# Patient Record
Sex: FEMALE | Race: WHITE | Employment: STUDENT | URBAN - METROPOLITAN AREA
[De-identification: names, ages, dates, MRNs, and addresses within clinical notes are randomized per-mention and may not be internally consistent; named-entity substitution may affect disease eponyms.]

---
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
## 2021-01-19 ENCOUNTER — OFFICE VISIT (OUTPATIENT)
Dept: ORTHOPEDICS CLINIC | Facility: CLINIC | Age: 21
End: 2021-01-19
Payer: COMMERCIAL

## 2021-01-19 VITALS — HEART RATE: 80 BPM | OXYGEN SATURATION: 99 %

## 2021-01-19 DIAGNOSIS — S63.092A SUBLUXATION OF LEFT EXTENSOR CARPI ULNARIS TENDON, INITIAL ENCOUNTER: Primary | ICD-10-CM

## 2021-01-19 PROCEDURE — 99203 OFFICE O/P NEW LOW 30 MIN: CPT | Performed by: ORTHOPAEDIC SURGERY
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
## 2021-02-12 ENCOUNTER — HOSPITAL ENCOUNTER (OUTPATIENT)
Dept: ULTRASOUND IMAGING | Facility: HOSPITAL | Age: 21
Discharge: HOME | End: 2021-02-12
Attending: ORTHOPAEDIC SURGERY
Payer: COMMERCIAL

## 2021-02-12 DIAGNOSIS — S63.092A SUBLUXATION OF LEFT EXTENSOR CARPI ULNARIS TENDON, INITIAL ENCOUNTER: ICD-10-CM

## 2021-02-12 PROCEDURE — 76881 US COMPL JOINT R-T W/IMG: CPT | Performed by: ORTHOPAEDIC SURGERY

## 2021-02-16 ENCOUNTER — HOSPITAL ENCOUNTER (OUTPATIENT)
Dept: MRI IMAGING | Facility: HOSPITAL | Age: 21
Discharge: HOME | End: 2021-02-16
Attending: ORTHOPAEDIC SURGERY
Payer: COMMERCIAL

## 2021-02-16 ENCOUNTER — HOSPITAL ENCOUNTER (OUTPATIENT)
Dept: GENERAL RADIOLOGY | Facility: HOSPITAL | Age: 21
Discharge: HOME | End: 2021-02-16
Attending: ORTHOPAEDIC SURGERY
Payer: COMMERCIAL

## 2021-02-16 DIAGNOSIS — S63.092A SUBLUXATION OF LEFT EXTENSOR CARPI ULNARIS TENDON, INITIAL ENCOUNTER: ICD-10-CM

## 2021-02-16 PROCEDURE — 73110 X-RAY EXAM OF WRIST: CPT | Performed by: ORTHOPAEDIC SURGERY

## 2021-02-16 PROCEDURE — 73221 MRI JOINT UPR EXTREM W/O DYE: CPT | Performed by: ORTHOPAEDIC SURGERY

## 2021-03-12 ENCOUNTER — OFFICE VISIT (OUTPATIENT)
Dept: ORTHOPEDICS CLINIC | Facility: CLINIC | Age: 21
End: 2021-03-12
Payer: COMMERCIAL

## 2021-03-12 ENCOUNTER — OFFICE VISIT (OUTPATIENT)
Dept: FAMILY MEDICINE CLINIC | Facility: CLINIC | Age: 21
End: 2021-03-12
Payer: COMMERCIAL

## 2021-03-12 VITALS
RESPIRATION RATE: 20 BRPM | TEMPERATURE: 98 F | HEIGHT: 63 IN | BODY MASS INDEX: 27.64 KG/M2 | HEART RATE: 80 BPM | WEIGHT: 156 LBS | DIASTOLIC BLOOD PRESSURE: 64 MMHG | SYSTOLIC BLOOD PRESSURE: 118 MMHG

## 2021-03-12 VITALS — HEART RATE: 80 BPM | OXYGEN SATURATION: 99 %

## 2021-03-12 DIAGNOSIS — Z11.3 ROUTINE SCREENING FOR STI (SEXUALLY TRANSMITTED INFECTION): ICD-10-CM

## 2021-03-12 DIAGNOSIS — Z00.00 ANNUAL PHYSICAL EXAM: Primary | ICD-10-CM

## 2021-03-12 DIAGNOSIS — Z30.09 COUNSELING FOR BIRTH CONTROL, ORAL CONTRACEPTIVES: ICD-10-CM

## 2021-03-12 DIAGNOSIS — M77.8 LEFT WRIST TENDONITIS: Primary | ICD-10-CM

## 2021-03-12 PROCEDURE — 3078F DIAST BP <80 MM HG: CPT | Performed by: FAMILY MEDICINE

## 2021-03-12 PROCEDURE — 3008F BODY MASS INDEX DOCD: CPT | Performed by: FAMILY MEDICINE

## 2021-03-12 PROCEDURE — 99395 PREV VISIT EST AGE 18-39: CPT | Performed by: FAMILY MEDICINE

## 2021-03-12 PROCEDURE — 99213 OFFICE O/P EST LOW 20 MIN: CPT | Performed by: ORTHOPAEDIC SURGERY

## 2021-03-12 PROCEDURE — 3074F SYST BP LT 130 MM HG: CPT | Performed by: FAMILY MEDICINE

## 2021-03-12 PROCEDURE — 20550 NJX 1 TENDON SHEATH/LIGAMENT: CPT | Performed by: ORTHOPAEDIC SURGERY

## 2021-03-12 RX ORDER — TRIAMCINOLONE ACETONIDE 40 MG/ML
40 INJECTION, SUSPENSION INTRA-ARTICULAR; INTRAMUSCULAR ONCE
Status: COMPLETED | OUTPATIENT
Start: 2021-03-12 | End: 2021-03-12

## 2021-03-12 RX ORDER — LEVONORGESTREL AND ETHINYL ESTRADIOL 0.1-0.02MG
1 KIT ORAL DAILY
Qty: 3 PACKAGE | Refills: 3 | Status: SHIPPED | OUTPATIENT
Start: 2021-03-12 | End: 2021-06-18

## 2021-03-12 RX ADMIN — TRIAMCINOLONE ACETONIDE 40 MG: 40 INJECTION, SUSPENSION INTRA-ARTICULAR; INTRAMUSCULAR at 08:30:00

## 2021-03-12 NOTE — PROGRESS NOTES
Justin Shah is a 24year old female who presents for a complete physical exam.   HPI:   Patient presents with:  Physical: WWE no pap, wondering if she can get on birth control        Annual Physical due on 03/12/2022       Symptoms: periods are regula medical history. History reviewed. No pertinent surgical history.    Family History   Problem Relation Age of Onset   • Other (Arthrodesis Cervical) Father    • Other (Hepatitis C) Father    • Cancer Mother         cervical   • Diabetes Paternal [place] obstetric history on file.        REVIEW OF SYSTEMS:   Review of Systems     EXAM:   /64   Pulse 80   Temp 98.3 °F (36.8 °C) (Temporal)   Resp 20   Ht 5' 3\" (1.6 m)   Wt 156 lb (70.8 kg)   LMP 02/28/2021 (Exact Date)   BMI 27.63 kg/m²  Body mass inde Levonorgestrel-Ethinyl Estrad. I am also having her maintain her Multiple Vitamin (MULTIVITAMIN OR).     Return in about 1 year (around 3/12/2022) for Annual physical.  Risk benefits of birth control is explained, routine contraceptive counseling, use and w
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
## 2021-04-09 ENCOUNTER — OFFICE VISIT (OUTPATIENT)
Dept: ORTHOPEDICS CLINIC | Facility: CLINIC | Age: 21
End: 2021-04-09
Payer: COMMERCIAL

## 2021-04-09 DIAGNOSIS — M79.602 LEFT ARM PAIN: Primary | ICD-10-CM

## 2021-04-09 PROCEDURE — 99213 OFFICE O/P EST LOW 20 MIN: CPT | Performed by: ORTHOPAEDIC SURGERY
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
## 2021-04-14 ENCOUNTER — HOSPITAL ENCOUNTER (OUTPATIENT)
Dept: MRI IMAGING | Facility: HOSPITAL | Age: 21
Discharge: HOME | End: 2021-04-14
Attending: ORTHOPAEDIC SURGERY
Payer: COMMERCIAL

## 2021-04-14 DIAGNOSIS — M79.602 LEFT ARM PAIN: ICD-10-CM

## 2021-04-14 PROCEDURE — 73218 MRI UPPER EXTREMITY W/O DYE: CPT | Performed by: ORTHOPAEDIC SURGERY

## 2021-05-12 ENCOUNTER — OFFICE VISIT (OUTPATIENT)
Dept: ORTHOPEDICS CLINIC | Facility: CLINIC | Age: 21
End: 2021-05-12
Payer: COMMERCIAL

## 2021-05-12 VITALS — OXYGEN SATURATION: 100 % | HEART RATE: 77 BPM

## 2021-05-12 DIAGNOSIS — M79.602 LEFT ARM PAIN: Primary | ICD-10-CM

## 2021-05-12 PROCEDURE — 99212 OFFICE O/P EST SF 10 MIN: CPT | Performed by: ORTHOPAEDIC SURGERY
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
## 2021-06-18 ENCOUNTER — TELEMEDICINE (OUTPATIENT)
Dept: FAMILY MEDICINE CLINIC | Facility: CLINIC | Age: 21
End: 2021-06-18

## 2021-06-18 VITALS — WEIGHT: 164 LBS | BODY MASS INDEX: 29 KG/M2 | TEMPERATURE: 98 F

## 2021-06-18 DIAGNOSIS — J02.9 PHARYNGITIS, UNSPECIFIED ETIOLOGY: Primary | ICD-10-CM

## 2021-06-18 PROCEDURE — 99213 OFFICE O/P EST LOW 20 MIN: CPT | Performed by: PHYSICIAN ASSISTANT

## 2021-06-18 NOTE — PATIENT INSTRUCTIONS
1. Mucinex  - Mucinex D behind pharmacist counter (decongestant included). 2. Nasal saline spray  3. Nyquil at night if needed.   4. Steam showers

## 2021-06-18 NOTE — PROGRESS NOTES
Telemedicine Visit     Virtual Video Check-In    Kwasi Murguia verbally consents to Video Check-In service on 6/18/21.  Kwasi Murguia understands and accepts financial responsibility for any deductible, co-insurance and/or co-pays associated with this emergency and because of restrictions of visitation. There are limitations of this visit as no physical exam could be performed. Every conscious effort was taken to allow for sufficient and adequate time.   This billing was spent on reviewing labs, medica

## 2021-08-03 ENCOUNTER — TELEPHONE (OUTPATIENT)
Dept: FAMILY MEDICINE CLINIC | Facility: CLINIC | Age: 21
End: 2021-08-03

## 2021-08-03 NOTE — TELEPHONE ENCOUNTER
DR Roverto Marlow  Pt is currently on oral birth control and requesting to have an IUD placed. Would this be for [facility] or [facility]?

## 2021-08-03 NOTE — TELEPHONE ENCOUNTER
Pt wanting to come in for IUD insertion. Please start prior auth process for Mirena. Pt was notified that it may take up to a week to hear back from ins company. Ins info up to date in chart.

## 2021-08-04 NOTE — TELEPHONE ENCOUNTER
I have not seen pt since 2018. Has she ever been pregnant/ had children? IF so, then mirena. If not, then akbar. If she would like to discuss options, then perhaps a quick virtual visit would be a good idea.      [id number], DO

## 2021-08-09 NOTE — TELEPHONE ENCOUNTER
Pt calling back to check on status of PA for IUD. Pt prefers Lefty Strange over Rox. No children. No pregnancies. Pt will be going back to school on Friday and would like to have it done before she leaves.

## 2021-08-09 NOTE — TELEPHONE ENCOUNTER
Triage Please call  See Dr Juanpablo Wilks below  [id number] [facility] staff got the original call

## 2021-08-10 NOTE — TELEPHONE ENCOUNTER
Certificate Information  Reference Number  [id number]    Status  NO ACTION REQUIRED    Message  Requested Service does not require preauthorization.

## 2021-12-27 NOTE — TELEPHONE ENCOUNTER
Spoke to patient and scheduled for 1/4/2022 with Dr. Roxy Casarez. Pt stated her insurance will remain the same for 2022. Pt stated she will be in the middle of her cycle will this be a problem since we preferred she comes towards the end of her cycle?

## 2021-12-27 NOTE — TELEPHONE ENCOUNTER
Please call patient and see if she still want Kyleena placement?    Return this message to [facility]

## 2022-01-04 ENCOUNTER — OFFICE VISIT (OUTPATIENT)
Dept: FAMILY MEDICINE CLINIC | Facility: CLINIC | Age: 22
End: 2022-01-04
Payer: COMMERCIAL

## 2022-01-04 ENCOUNTER — TELEPHONE (OUTPATIENT)
Dept: FAMILY MEDICINE CLINIC | Facility: CLINIC | Age: 22
End: 2022-01-04

## 2022-01-04 VITALS
TEMPERATURE: 99 F | RESPIRATION RATE: 20 BRPM | SYSTOLIC BLOOD PRESSURE: 102 MMHG | HEIGHT: 63 IN | BODY MASS INDEX: 31.89 KG/M2 | WEIGHT: 180 LBS | HEART RATE: 76 BPM | DIASTOLIC BLOOD PRESSURE: 66 MMHG

## 2022-01-04 DIAGNOSIS — Z30.430 ENCOUNTER FOR IUD INSERTION: Primary | ICD-10-CM

## 2022-01-04 LAB
CONTROL LINE PRESENT WITH A CLEAR BACKGROUND (YES/NO): YES YES/NO
PREGNANCY TEST, URINE: NEGATIVE

## 2022-01-04 PROCEDURE — 81025 URINE PREGNANCY TEST: CPT | Performed by: FAMILY MEDICINE

## 2022-01-04 PROCEDURE — 3008F BODY MASS INDEX DOCD: CPT | Performed by: FAMILY MEDICINE

## 2022-01-04 PROCEDURE — 3074F SYST BP LT 130 MM HG: CPT | Performed by: FAMILY MEDICINE

## 2022-01-04 PROCEDURE — 3078F DIAST BP <80 MM HG: CPT | Performed by: FAMILY MEDICINE

## 2022-01-04 PROCEDURE — 58300 INSERT INTRAUTERINE DEVICE: CPT | Performed by: FAMILY MEDICINE

## 2022-01-04 RX ORDER — SERTRALINE HYDROCHLORIDE 25 MG/1
TABLET, FILM COATED ORAL
COMMUNITY
Start: 2021-12-07

## 2022-01-04 NOTE — TELEPHONE ENCOUNTER
Received medical records request from patient requesting medical records from June 2015-present. All records printed.  Called patient and let her know records were ready for  per her request

## 2022-01-13 PROCEDURE — 3078F DIAST BP <80 MM HG: CPT | Performed by: FAMILY MEDICINE

## 2022-01-13 PROCEDURE — 3074F SYST BP LT 130 MM HG: CPT | Performed by: FAMILY MEDICINE

## 2022-01-13 PROCEDURE — 3008F BODY MASS INDEX DOCD: CPT | Performed by: FAMILY MEDICINE

## 2022-01-14 NOTE — PROGRESS NOTES
S: Pt here for IUD insertion.      O:    01/04/22  1516   BP: 102/66   Pulse: 76   Resp: 20   Temp: 98.9 °F (37.2 °C)       A/P: Risks of the insertion procedure, including but not limited to cramping, displaced threads, ectopic pregnancy, embedment or frag

## 2022-04-06 RX ORDER — ESCITALOPRAM OXALATE 10 MG/1
TABLET ORAL
COMMUNITY
Start: 2022-01-19

## 2022-04-22 ENCOUNTER — OFFICE VISIT (OUTPATIENT)
Dept: FAMILY MEDICINE CLINIC | Facility: CLINIC | Age: 22
End: 2022-04-22
Payer: COMMERCIAL

## 2022-04-22 VITALS
RESPIRATION RATE: 16 BRPM | HEIGHT: 63 IN | WEIGHT: 200 LBS | OXYGEN SATURATION: 98 % | SYSTOLIC BLOOD PRESSURE: 110 MMHG | HEART RATE: 98 BPM | BODY MASS INDEX: 35.44 KG/M2 | DIASTOLIC BLOOD PRESSURE: 70 MMHG

## 2022-04-22 DIAGNOSIS — N94.9 VAGINAL BURNING: ICD-10-CM

## 2022-04-22 DIAGNOSIS — Z30.431 IUD CHECK UP: ICD-10-CM

## 2022-04-22 DIAGNOSIS — Z00.00 ROUTINE HEALTH MAINTENANCE: ICD-10-CM

## 2022-04-22 DIAGNOSIS — R63.5 ABNORMAL WEIGHT GAIN: Primary | ICD-10-CM

## 2022-04-22 PROCEDURE — 87660 TRICHOMONAS VAGIN DIR PROBE: CPT | Performed by: FAMILY MEDICINE

## 2022-04-22 PROCEDURE — 3008F BODY MASS INDEX DOCD: CPT | Performed by: FAMILY MEDICINE

## 2022-04-22 PROCEDURE — 3078F DIAST BP <80 MM HG: CPT | Performed by: FAMILY MEDICINE

## 2022-04-22 PROCEDURE — 87480 CANDIDA DNA DIR PROBE: CPT | Performed by: FAMILY MEDICINE

## 2022-04-22 PROCEDURE — 99214 OFFICE O/P EST MOD 30 MIN: CPT | Performed by: FAMILY MEDICINE

## 2022-04-22 PROCEDURE — 3074F SYST BP LT 130 MM HG: CPT | Performed by: FAMILY MEDICINE

## 2022-04-22 PROCEDURE — 87510 GARDNER VAG DNA DIR PROBE: CPT | Performed by: FAMILY MEDICINE

## 2022-04-25 ENCOUNTER — PATIENT MESSAGE (OUTPATIENT)
Dept: FAMILY MEDICINE CLINIC | Facility: CLINIC | Age: 22
End: 2022-04-25

## 2022-04-25 RX ORDER — METRONIDAZOLE 7.5 MG/G
1 GEL VAGINAL NIGHTLY
Qty: 70 G | Refills: 0 | Status: SHIPPED | OUTPATIENT
Start: 2022-04-25 | End: 2022-05-02

## 2022-04-25 NOTE — TELEPHONE ENCOUNTER
Pauly Aguilar is planning on going back to school Tomorrow .  Just wanted to make sure message went through

## 2022-12-26 ENCOUNTER — HOSPITAL ENCOUNTER (OUTPATIENT)
Age: 22
Discharge: HOME | End: 2022-12-26
Attending: EMERGENCY MEDICINE
Payer: COMMERCIAL

## 2022-12-26 VITALS
TEMPERATURE: 98 F | SYSTOLIC BLOOD PRESSURE: 135 MMHG | HEART RATE: 94 BPM | HEIGHT: 63 IN | BODY MASS INDEX: 33.66 KG/M2 | WEIGHT: 190 LBS | RESPIRATION RATE: 24 BRPM | OXYGEN SATURATION: 99 % | DIASTOLIC BLOOD PRESSURE: 75 MMHG

## 2022-12-26 DIAGNOSIS — R09.82 POST-NASAL DRIP: Primary | ICD-10-CM

## 2022-12-26 LAB
POCT INFLUENZA A: NEGATIVE
POCT INFLUENZA B: NEGATIVE
SARS-COV-2 RNA RESP QL NAA+PROBE: NOT DETECTED

## 2022-12-26 PROCEDURE — 99212 OFFICE O/P EST SF 10 MIN: CPT

## 2022-12-26 PROCEDURE — 87502 INFLUENZA DNA AMP PROBE: CPT | Performed by: EMERGENCY MEDICINE

## 2022-12-26 PROCEDURE — 99203 OFFICE O/P NEW LOW 30 MIN: CPT

## 2022-12-26 NOTE — ED INITIAL ASSESSMENT (HPI)
Patient presents to IC with c/o sinus pressure/congestion x 2 weeks. States covid  And strep were negative last week. No fever. College student.

## 2022-12-29 ENCOUNTER — TELEPHONE (OUTPATIENT)
Dept: FAMILY MEDICINE CLINIC | Facility: CLINIC | Age: 22
End: 2022-12-29

## 2022-12-29 RX ORDER — AMOXICILLIN 500 MG/1
1000 CAPSULE ORAL 2 TIMES DAILY
Qty: 40 CAPSULE | Refills: 0 | Status: SHIPPED | OUTPATIENT
Start: 2022-12-29 | End: 2023-01-08

## 2022-12-29 NOTE — TELEPHONE ENCOUNTER
Called and told pataracelin of new prescription from Dr Selin Hathaway and if not better when this is done we need to see her

## 2022-12-29 NOTE — TELEPHONE ENCOUNTER
Called and talked to patient she has been using Nasacort and sudafed it is not working she cough and runny nose neg covid she has had this for 6 weeks not getting better Our next open appointment is 1/5/23 she is asking if Dr Bucky Gutiérrez knows of anything else that might control the cough and decongest he nose

## 2022-12-29 NOTE — TELEPHONE ENCOUNTER
Pt went to urgent have a post nasal drip and gave meds and told to call us if not feeling better after 3 days.  She has sore throat and can taste blood   Negative flu and covid

## (undated) DIAGNOSIS — N76.0 BV (BACTERIAL VAGINOSIS): Primary | ICD-10-CM

## (undated) DIAGNOSIS — B96.89 BV (BACTERIAL VAGINOSIS): Primary | ICD-10-CM